# Patient Record
Sex: FEMALE | Race: WHITE | NOT HISPANIC OR LATINO | Employment: STUDENT
[De-identification: names, ages, dates, MRNs, and addresses within clinical notes are randomized per-mention and may not be internally consistent; named-entity substitution may affect disease eponyms.]

---

## 2017-09-24 ENCOUNTER — HEALTH MAINTENANCE LETTER (OUTPATIENT)
Age: 27
End: 2017-09-24

## 2024-10-28 ENCOUNTER — OFFICE VISIT (OUTPATIENT)
Dept: URGENT CARE | Facility: URGENT CARE | Age: 34
End: 2024-10-28
Payer: COMMERCIAL

## 2024-10-28 ENCOUNTER — ANCILLARY PROCEDURE (OUTPATIENT)
Dept: GENERAL RADIOLOGY | Facility: CLINIC | Age: 34
End: 2024-10-28
Payer: COMMERCIAL

## 2024-10-28 VITALS
SYSTOLIC BLOOD PRESSURE: 124 MMHG | DIASTOLIC BLOOD PRESSURE: 77 MMHG | RESPIRATION RATE: 16 BRPM | WEIGHT: 162.3 LBS | OXYGEN SATURATION: 96 % | TEMPERATURE: 98.7 F | HEART RATE: 117 BPM

## 2024-10-28 DIAGNOSIS — J18.9 PNEUMONIA OF LEFT LOWER LOBE DUE TO INFECTIOUS ORGANISM: Primary | ICD-10-CM

## 2024-10-28 DIAGNOSIS — R05.2 SUBACUTE COUGH: ICD-10-CM

## 2024-10-28 DIAGNOSIS — Z20.818 EXPOSURE TO STREP THROAT: ICD-10-CM

## 2024-10-28 PROBLEM — O34.219 PREVIOUS CESAREAN DELIVERY, ANTEPARTUM CONDITION OR COMPLICATION: Status: ACTIVE | Noted: 2018-04-20

## 2024-10-28 PROBLEM — Z97.5 IUD (INTRAUTERINE DEVICE) IN PLACE: Status: ACTIVE | Noted: 2024-04-08

## 2024-10-28 LAB
DEPRECATED S PYO AG THROAT QL EIA: NEGATIVE
GROUP A STREP BY PCR: NOT DETECTED

## 2024-10-28 PROCEDURE — 71046 X-RAY EXAM CHEST 2 VIEWS: CPT | Mod: TC | Performed by: RADIOLOGY

## 2024-10-28 PROCEDURE — 87651 STREP A DNA AMP PROBE: CPT

## 2024-10-28 PROCEDURE — 99203 OFFICE O/P NEW LOW 30 MIN: CPT

## 2024-10-28 RX ORDER — AZITHROMYCIN 250 MG/1
TABLET, FILM COATED ORAL
Qty: 6 TABLET | Refills: 0 | Status: SHIPPED | OUTPATIENT
Start: 2024-10-28 | End: 2024-11-02

## 2024-10-28 RX ORDER — DEXTROAMPHETAMINE SACCHARATE, AMPHETAMINE ASPARTATE MONOHYDRATE, DEXTROAMPHETAMINE SULFATE AND AMPHETAMINE SULFATE 6.25; 6.25; 6.25; 6.25 MG/1; MG/1; MG/1; MG/1
CAPSULE, EXTENDED RELEASE ORAL EVERY MORNING
COMMUNITY
Start: 2024-10-15

## 2024-10-28 RX ORDER — AMOXICILLIN 500 MG/1
1000 CAPSULE ORAL 3 TIMES DAILY
Qty: 30 CAPSULE | Refills: 0 | Status: SHIPPED | OUTPATIENT
Start: 2024-10-28 | End: 2024-11-02

## 2024-10-28 NOTE — PATIENT INSTRUCTIONS
Chest x-ray shows the following per the radiologist report:  Patchy consolidation at the left lung base may represent pneumonia. Normal cardiac silhouette. Take the antibiotic as prescribed and finish the full course even if symptoms improve.  Try yogurt with active cultures or probiotics such as Culturelle daily to help prevent diarrhea while using antibiotics.  Follow-up with your primary care provider in 7 to 10 days for recheck.  Go to the emergency department with any new or worsening symptoms.

## 2024-10-28 NOTE — PROGRESS NOTES
ASSESSMENT:  (J18.9) Pneumonia of left lower lobe due to infectious organism  (primary encounter diagnosis)  Plan: azithromycin (ZITHROMAX) 250 MG tablet,         amoxicillin (AMOXIL) 500 MG capsule    (Z20.818) Exposure to strep throat  Plan: Streptococcus A Rapid Screen w/Reflex to PCR,         Group A Streptococcus PCR Throat Swab    (R05.2) Subacute cough  Plan: XR Chest 2 Views    PLAN:  Patient elected to leave the clinic prior to the strep and chest x-ray results and follow-up via MyChart.  Strep and chest x-ray results discussed with patient over the phone.  The following information was posted on [employer]:Chest x-ray shows the following per the radiologist report:  Patchy consolidation at the left lung base may represent pneumonia. Normal cardiac silhouette. Take the antibiotic as prescribed and finish the full course even if symptoms improve.  Try yogurt with active cultures or probiotics such as Culturelle daily to help prevent diarrhea while using antibiotics.  Follow-up with your primary care provider in 7 to 10 days for recheck.  Go to the emergency department with any new or worsening symptoms.    The use of Dragon/[employer] dictation services may have been used to construct the content in this note; any grammatical or spelling errors are non-intentional. Please contact the author of this note directly if you are in need of any clarification.      VERONA Pugh CNP      SUBJECTIVE:   Pretty Zuñiga is a 33 year old female presenting with a chief complaint of dry and slightly productive cough.  Onset of symptoms was 5 week(s) ago.  Course of illness is waxing and waning.    Patient denies: fever, runny nose, stuffy nose, ear pain, vomiting, and diarrhea  Treatment measures tried include Mucinex.  Predisposing factors include exposure to strep.    ROS:  Negative except noted above.    OBJECTIVE:  /77 (BP Location: Right arm, Patient Position: Sitting, Cuff Size: Adult Regular)    Pulse 117   Temp 98.7  F (37.1  C) (Oral)   Resp 16   Wt 73.6 kg (162 lb 4.8 oz)   SpO2 96%   GENERAL APPEARANCE: healthy, alert and no distress  EYES: EOMI,  PERRL, conjunctiva clear  HENT: nose and mouth without erythema, ulcers or lesions and oral mucous membranes moist, no erythema noted  NECK: supple, nontender, no lymphadenopathy  RESP: lungs clear to auscultation - no rales, rhonchi or wheezes  CV: regular rates and rhythm, normal S1 S2, no murmur noted  SKIN: no suspicious lesions or rashes    Rapid Strep test: Negative    X-RAY: Chest x-ray shows the following per the radiologist report:  Patchy consolidation at the left lung base may represent pneumonia. Normal cardiac silhouette.

## 2024-11-16 ENCOUNTER — HEALTH MAINTENANCE LETTER (OUTPATIENT)
Age: 34
End: 2024-11-16

## 2025-02-01 ENCOUNTER — OFFICE VISIT (OUTPATIENT)
Dept: URGENT CARE | Facility: URGENT CARE | Age: 35
End: 2025-02-01
Payer: COMMERCIAL

## 2025-02-01 VITALS
SYSTOLIC BLOOD PRESSURE: 126 MMHG | OXYGEN SATURATION: 100 % | RESPIRATION RATE: 18 BRPM | DIASTOLIC BLOOD PRESSURE: 80 MMHG | HEART RATE: 86 BPM | WEIGHT: 161 LBS | TEMPERATURE: 97.5 F

## 2025-02-01 DIAGNOSIS — R06.2 WHEEZING: ICD-10-CM

## 2025-02-01 DIAGNOSIS — H66.001 ACUTE SUPPURATIVE OTITIS MEDIA OF RIGHT EAR WITHOUT SPONTANEOUS RUPTURE OF TYMPANIC MEMBRANE, RECURRENCE NOT SPECIFIED: Primary | ICD-10-CM

## 2025-02-01 DIAGNOSIS — J01.90 ACUTE SINUSITIS WITH SYMPTOMS > 10 DAYS: ICD-10-CM

## 2025-02-01 PROCEDURE — 99214 OFFICE O/P EST MOD 30 MIN: CPT | Performed by: PHYSICIAN ASSISTANT

## 2025-02-01 RX ORDER — AMOXICILLIN 875 MG/1
875 TABLET, COATED ORAL 2 TIMES DAILY
Qty: 20 TABLET | Refills: 0 | Status: SHIPPED | OUTPATIENT
Start: 2025-02-01

## 2025-02-01 RX ORDER — ALBUTEROL SULFATE 90 UG/1
1-2 INHALANT RESPIRATORY (INHALATION) EVERY 6 HOURS PRN
Qty: 17 G | Refills: 0 | Status: SHIPPED | OUTPATIENT
Start: 2025-02-01

## 2025-02-01 RX ORDER — AMOXICILLIN 875 MG/1
875 TABLET, COATED ORAL 2 TIMES DAILY
Qty: 20 TABLET | Refills: 0 | Status: SHIPPED | OUTPATIENT
Start: 2025-02-01 | End: 2025-02-01

## 2025-02-01 ASSESSMENT — PAIN SCALES - GENERAL: PAINLEVEL_OUTOF10: MILD PAIN (3)

## 2025-02-01 NOTE — PROGRESS NOTES
Chief Complaint   Patient presents with    Sinus Problem     Sinus pressure, drainage, ear (friend looked in ear and saw blood and cannot hear well - right), cough - been sick for two weeks                  ASSESSMENT:     ICD-10-CM    1. Acute suppurative otitis media of right ear without spontaneous rupture of tympanic membrane, recurrence not specified  H66.001 amoxicillin (AMOXIL) 875 MG tablet     albuterol (PROAIR HFA/PROVENTIL HFA/VENTOLIN HFA) 108 (90 Base) MCG/ACT inhaler      2. Acute sinusitis with symptoms > 10 days  J01.90 amoxicillin (AMOXIL) 875 MG tablet     albuterol (PROAIR HFA/PROVENTIL HFA/VENTOLIN HFA) 108 (90 Base) MCG/ACT inhaler      3. Wheezing  R06.2 amoxicillin (AMOXIL) 875 MG tablet     albuterol (PROAIR HFA/PROVENTIL HFA/VENTOLIN HFA) 108 (90 Base) MCG/ACT inhaler            PLAN: Acute sinusitis and ROM.  Oral amoxicillin.  Albuterol inhaler.  I have discussed clinical findings with patient.  Side effects of medications discussed.  Symptomatic care is discussed.  I have discussed the possibility of  worsening symptoms and indication to RTC or go to the ER if they occur.  All questions are answered, patient indicates understanding of these issues and is in agreement with plan.   Patient care instructions are discussed/given at the end of visit.   Lots of rest and fluids.      Kathryn Mckeon PA-C      SUBJECTIVE:  34-year-old female presents for sinus congestion with postnasal drip for 2 weeks.  No ear pain but some decreased hearing.  Friend looked at her ears and thought that there may be some blood in the left canal.  No fever.  Some cough with a little bit of wheeze at the end.  No history of asthma but has been using her child's nebulizer which has seemed to help.      No Known Allergies    Past Medical History:   Diagnosis Date    Bronchospasm        Current Outpatient Medications   Medication Sig Dispense Refill    amphetamine-dextroamphetamine (ADDERALL XR) 25 MG 24 hr  capsule Take by mouth every morning.      Cetirizine HCl (ZYRTEC PO) Take by mouth. (Patient not taking: Reported on 2/1/2025)      MICROGESTIN 1.5/30 1.5-30 MG-MCG OR TABS 1 TABLET DAILY (Patient not taking: Reported on 2/1/2025) 84 3     No current facility-administered medications for this visit.       Social History     Tobacco Use    Smoking status: Former     Current packs/day: 0.00     Types: Cigarettes     Quit date: 11/14/2009     Years since quitting: 15.2    Smokeless tobacco: Never   Substance Use Topics    Alcohol use: No       ROS:  CONSTITUTIONAL: Negative for fatigue or fever.  EYES: Negative for eye problems.  ENT: As above.  RESP: As above.  NEUROLOGIC: Negative for headaches.  SKIN: Negative for rash.  PSYCH: Normal mentation for age.    OBJECTIVE:  /80 (BP Location: Left arm, Patient Position: Sitting, Cuff Size: Adult Regular)   Pulse 86   Temp 97.5  F (36.4  C) (Oral)   Resp 18   Wt 73 kg (161 lb)   SpO2 100%   GENERAL APPEARANCE: Healthy, alert and no distress.  To hear a slight wheeze at the end of a forced exhalation.  EYES:Conjunctiva/sclera clear.  EARS: No cerumen.   Ear canals w/o erythema.  Left ear canal with tiny abrasion at 7:00.  No active bleeding or pooling of blood.  Left TM, upper portion is erythematous and bulging.  Right TM is translucent.      THROAT: No erythema w/o tonsillar enlargement . No exudates.  NECK: Supple, nontender, no lymphadenopathy.  RESP: Lungs clear to auscultation - no rales, rhonchi or wheezes  CV: Regular rate and rhythm, normal S1 S2, no murmur noted.  NEURO: Awake, alert    SKIN: No rashes      Kathryn Mckeon PA-C

## 2025-04-21 DIAGNOSIS — H66.001 ACUTE SUPPURATIVE OTITIS MEDIA OF RIGHT EAR WITHOUT SPONTANEOUS RUPTURE OF TYMPANIC MEMBRANE, RECURRENCE NOT SPECIFIED: ICD-10-CM

## 2025-04-21 DIAGNOSIS — R06.2 WHEEZING: ICD-10-CM

## 2025-04-21 DIAGNOSIS — J01.90 ACUTE SINUSITIS WITH SYMPTOMS > 10 DAYS: ICD-10-CM

## 2025-04-22 RX ORDER — ALBUTEROL SULFATE 90 UG/1
1-2 INHALANT RESPIRATORY (INHALATION) EVERY 6 HOURS PRN
Qty: 17 G | Refills: 0 | Status: SHIPPED | OUTPATIENT
Start: 2025-04-22